# Patient Record
Sex: FEMALE | Race: WHITE | NOT HISPANIC OR LATINO | URBAN - METROPOLITAN AREA
[De-identification: names, ages, dates, MRNs, and addresses within clinical notes are randomized per-mention and may not be internally consistent; named-entity substitution may affect disease eponyms.]

---

## 2022-02-28 ENCOUNTER — WEB ENCOUNTER (OUTPATIENT)
Dept: URBAN - METROPOLITAN AREA CLINIC 113 | Facility: CLINIC | Age: 22
End: 2022-02-28

## 2022-02-28 ENCOUNTER — OFFICE VISIT (OUTPATIENT)
Dept: URBAN - METROPOLITAN AREA CLINIC 113 | Facility: CLINIC | Age: 22
End: 2022-02-28
Payer: COMMERCIAL

## 2022-02-28 VITALS
TEMPERATURE: 98.2 F | RESPIRATION RATE: 18 BRPM | HEART RATE: 57 BPM | BODY MASS INDEX: 24.35 KG/M2 | HEIGHT: 60 IN | DIASTOLIC BLOOD PRESSURE: 78 MMHG | SYSTOLIC BLOOD PRESSURE: 114 MMHG | WEIGHT: 124 LBS

## 2022-02-28 DIAGNOSIS — R10.10 UPPER ABDOMINAL PAIN: ICD-10-CM

## 2022-02-28 DIAGNOSIS — R14.0 ABDOMINAL BLOATING: ICD-10-CM

## 2022-02-28 DIAGNOSIS — K62.5 BRIGHT RED BLOOD PER RECTUM: ICD-10-CM

## 2022-02-28 PROCEDURE — 99204 OFFICE O/P NEW MOD 45 MIN: CPT | Performed by: NURSE PRACTITIONER

## 2022-02-28 RX ORDER — HYDROCORTISONE ACETATE 25 MG/1
1 SUPPOSITORY SUPPOSITORY RECTAL
Qty: 7 SUPPOSITORIES | Refills: 1 | OUTPATIENT

## 2022-02-28 NOTE — HPI-TODAY'S VISIT:
This is a 21-year-old female with a history of migraines, constipation predominant irritable bowel syndrome, and an umbilical hernia referred from Dr. Mooney for evaluation of rectal bleeding. She reports onset of intermittent rectal bleeding early in 2021.  Frequency is increasing.  Currently, she has bleeding at least once a week reporting bright red blood on the tissue or in the toilet water.  Last week, she noticed an increase in volume.  She denies proctalgia or perianal swelling.  She has a history of constipation for which she took Linzess and Trulance in the past.  On daily therapy, her stools became too frequent.  Now, she is having a bowel movement every day without medications.  She reports her stools are of a normal consistency.  Occasionally, she has hard stools.  She reports frequent bloating.  She also has infrequent cramping across her upper abdomen that may occur before or after a meal.  This is unassociated with bowel movements.  She does not feel as though it is significant enough to warrant medication.  She denies any other abdominal symptoms.  She takes Advil or ibuprofen or Tylenol for headaches.  She reports a stable weight.  She had a colonoscopy at [facility] in October 2021.  She reports a normal exam.  The report is unavailable.  She provides lab results on her electronic chart accessed by her phone.   Labs  7/27/2021: CBC: WBC 9.4, hemoglobin 13.2, MCV 96, platelet 272.  TSH 1.50.  Free T4 8.5.   3/18/2021 CBC: WBC 9.4, hemoglobin 13.2, MCV 96, platelet 272.  Normal BMP and LFTs.

## 2022-02-28 NOTE — PHYSICAL EXAM RECTAL:
normal tone , no external hemorrhoids , no masses palpable , no melena , no red blood , No tenderness on NADIA

## 2022-03-18 ENCOUNTER — TELEPHONE ENCOUNTER (OUTPATIENT)
Dept: URBAN - METROPOLITAN AREA CLINIC 113 | Facility: CLINIC | Age: 22
End: 2022-03-18

## 2022-03-29 ENCOUNTER — OFFICE VISIT (OUTPATIENT)
Dept: URBAN - METROPOLITAN AREA CLINIC 107 | Facility: CLINIC | Age: 22
End: 2022-03-29
Payer: COMMERCIAL

## 2022-03-29 VITALS
RESPIRATION RATE: 18 BRPM | HEIGHT: 60 IN | HEART RATE: 60 BPM | DIASTOLIC BLOOD PRESSURE: 84 MMHG | SYSTOLIC BLOOD PRESSURE: 120 MMHG | BODY MASS INDEX: 23.75 KG/M2 | WEIGHT: 121 LBS | TEMPERATURE: 98.2 F

## 2022-03-29 DIAGNOSIS — R12 HEARTBURN: ICD-10-CM

## 2022-03-29 DIAGNOSIS — K92.1 MELENA: ICD-10-CM

## 2022-03-29 DIAGNOSIS — R10.13 EPIGASTRIC PAIN: ICD-10-CM

## 2022-03-29 DIAGNOSIS — K62.5 BRIGHT RED BLOOD PER RECTUM: ICD-10-CM

## 2022-03-29 PROCEDURE — 99214 OFFICE O/P EST MOD 30 MIN: CPT

## 2022-03-29 RX ORDER — HYDROCORTISONE ACETATE 25 MG/1
1 SUPPOSITORY SUPPOSITORY RECTAL
Qty: 7 SUPPOSITORIES | Refills: 1 | Status: ON HOLD | COMMUNITY

## 2022-03-29 RX ORDER — PANTOPRAZOLE SODIUM 40 MG/1
1 TABLET TABLET, DELAYED RELEASE ORAL ONCE A DAY
Qty: 90 TABLET | Refills: 0 | OUTPATIENT
Start: 2022-03-29

## 2022-03-29 NOTE — HPI-OTHER HISTORIES
She had a colonoscopy at [facility] in October 2021.  She reports a normal exam.  The report is unavailable.  She provides lab results on her electronic chart accessed by her phone.   Labs  7/27/2021: CBC: WBC 9.4, hemoglobin 13.2, MCV 96, platelet 272.  TSH 1.50.  Free T4 8.5.   3/18/2021 CBC: WBC 9.4, hemoglobin 13.2, MCV 96, platelet 272.  Normal BMP and LFTs.

## 2022-03-29 NOTE — HPI-TODAY'S VISIT:
21-year-old female with history of migraines, constipation predominant IBS, and umbilical hernia presents for evaluation of black stools, abdominal cramping and burning.  She was last seen on 2/28/2022 as a referral for rectal bleeding.  Rectal bleeding was intermittent and NADIA was negative for external hemorrhoids or fissures.  Internal hemorrhoids were suspected she had a normal colonoscopy findings prior.  She was recommended to try daily fiber and was prescribed Anusol suppositories for topical therapy.  We will attempt to obtain her colonoscopy records in the interim.  Her abdominal bloating and upper abdominal cramping is likely functional associated with a history of IBS.  She was also recommended a trial of low FODMAP diet.  She was also recommended a trial of IBgard twice daily.  Patient contacted the office on 3/18 reporting black tarry stools after use of Anusol suppository.  She was advised to stop NSAIDs and suppositories and monitor for persisting symptoms.  On 3/23/2022 she reported resolution of black tarry stools however she complained of stomach cramping, headache and a burning sensation in her stomach in the "bad hungry pain in her stomach."   Patient denies any further episodes of melena. She does however continue to experience epigastric burning and pain described as "hunger pain." She has taken NSAIDs on occasion over the last few years for headaches and migraines, but nothing excessive. She also admits to recent heartburn and nausea after consuming three alcoholic beverages at a wedding. Bowel movements are regular and daily. No BRBPR. She has not used any additional Anusol suppositories.

## 2022-05-03 ENCOUNTER — OFFICE VISIT (OUTPATIENT)
Dept: URBAN - METROPOLITAN AREA SURGERY CENTER 25 | Facility: SURGERY CENTER | Age: 22
End: 2022-05-03
Payer: COMMERCIAL

## 2022-05-03 ENCOUNTER — OFFICE VISIT (OUTPATIENT)
Dept: URBAN - METROPOLITAN AREA CLINIC 107 | Facility: CLINIC | Age: 22
End: 2022-05-03

## 2022-05-03 DIAGNOSIS — K31.89 REACTIVE GASTROPATHY: ICD-10-CM

## 2022-05-03 DIAGNOSIS — K92.1 MELENA: ICD-10-CM

## 2022-05-03 PROCEDURE — 43239 EGD BIOPSY SINGLE/MULTIPLE: CPT | Performed by: INTERNAL MEDICINE

## 2022-05-03 PROCEDURE — G8907 PT DOC NO EVENTS ON DISCHARG: HCPCS | Performed by: INTERNAL MEDICINE

## 2022-05-03 RX ORDER — PANTOPRAZOLE SODIUM 40 MG/1
1 TABLET TABLET, DELAYED RELEASE ORAL ONCE A DAY
Qty: 90 TABLET | Refills: 0 | Status: ACTIVE | COMMUNITY
Start: 2022-03-29

## 2022-05-03 RX ORDER — HYDROCORTISONE ACETATE 25 MG/1
1 SUPPOSITORY SUPPOSITORY RECTAL
Qty: 7 SUPPOSITORIES | Refills: 1 | Status: ON HOLD | COMMUNITY

## 2022-05-31 ENCOUNTER — DASHBOARD ENCOUNTERS (OUTPATIENT)
Age: 22
End: 2022-05-31

## 2022-05-31 ENCOUNTER — OFFICE VISIT (OUTPATIENT)
Dept: URBAN - METROPOLITAN AREA CLINIC 107 | Facility: CLINIC | Age: 22
End: 2022-05-31
Payer: COMMERCIAL

## 2022-05-31 VITALS
BODY MASS INDEX: 24.15 KG/M2 | WEIGHT: 123 LBS | RESPIRATION RATE: 18 BRPM | HEIGHT: 60 IN | HEART RATE: 64 BPM | SYSTOLIC BLOOD PRESSURE: 106 MMHG | DIASTOLIC BLOOD PRESSURE: 71 MMHG | TEMPERATURE: 97.8 F

## 2022-05-31 DIAGNOSIS — K62.5 BRIGHT RED BLOOD PER RECTUM: ICD-10-CM

## 2022-05-31 DIAGNOSIS — R14.0 ABDOMINAL BLOATING: ICD-10-CM

## 2022-05-31 DIAGNOSIS — R10.10 UPPER ABDOMINAL PAIN: ICD-10-CM

## 2022-05-31 DIAGNOSIS — R10.13 EPIGASTRIC PAIN: ICD-10-CM

## 2022-05-31 DIAGNOSIS — R12 HEARTBURN: ICD-10-CM

## 2022-05-31 DIAGNOSIS — K92.1 MELENA: ICD-10-CM

## 2022-05-31 PROBLEM — 74474003: Status: ACTIVE | Noted: 2022-02-28

## 2022-05-31 PROBLEM — 83132003: Status: ACTIVE | Noted: 2022-02-28

## 2022-05-31 PROBLEM — 116289008: Status: ACTIVE | Noted: 2022-02-28

## 2022-05-31 PROCEDURE — 99214 OFFICE O/P EST MOD 30 MIN: CPT | Performed by: INTERNAL MEDICINE

## 2022-05-31 RX ORDER — HYDROCORTISONE ACETATE 25 MG/1
1 SUPPOSITORY SUPPOSITORY RECTAL
Qty: 7 SUPPOSITORIES | Refills: 1 | Status: ON HOLD | COMMUNITY

## 2022-05-31 RX ORDER — PANTOPRAZOLE SODIUM 40 MG/1
1 TABLET TABLET, DELAYED RELEASE ORAL ONCE A DAY
Qty: 90 TABLET | Refills: 0 | Status: ACTIVE | COMMUNITY
Start: 2022-03-29

## 2022-05-31 NOTE — HPI-TODAY'S VISIT:
22-year-old female presenting for follow-up.  She was last seen in the clinic in March 2022. She did have an upper endoscopy performed on May 3, 2022.  She is found have a normal esophagus.  She did have diffuse gastritis throughout the antrum and body with aphthous ulcerations. Biopsies were significant for chemical reactive gastritis.  Her biopsies were negative for H. pylori or intestinal metaplasia.  She has been taking pantoprazole once daily.  She does also avoid NSAIDs.  She feels like her symptoms have improved some.  3/29/22 21-year-old female with history of migraines, constipation predominant IBS, and umbilical hernia presents for evaluation of black stools, abdominal cramping and burning.  She was last seen on 2/28/2022 as a referral for rectal bleeding.  Rectal bleeding was intermittent and NADIA was negative for external hemorrhoids or fissures.  Internal hemorrhoids were suspected she had a normal colonoscopy findings prior.  She was recommended to try daily fiber and was prescribed Anusol suppositories for topical therapy.  We will attempt to obtain her colonoscopy records in the interim.  Her abdominal bloating and upper abdominal cramping is likely functional associated with a history of IBS.  She was also recommended a trial of low FODMAP diet.  She was also recommended a trial of IBgard twice daily.  Patient contacted the office on 3/18 reporting black tarry stools after use of Anusol suppository.  She was advised to stop NSAIDs and suppositories and monitor for persisting symptoms.  On 3/23/2022 she reported resolution of black tarry stools however she complained of stomach cramping, headache and a burning sensation in her stomach in the "bad hungry pain in her stomach."   Patient denies any further episodes of melena. She does however continue to experience epigastric burning and pain described as "hunger pain." She has taken NSAIDs on occasion over the last few years for headaches and migraines, but nothing excessive. She also admits to recent heartburn and nausea after consuming three alcoholic beverages at a wedding. Bowel movements are regular and daily. No BRBPR. She has not used any additional Anusol suppositories. This is a 21-year-old female with a history of migraines, constipation predominant irritable bowel syndrome, and an umbilical hernia referred from Dr. Mooney for evaluation of rectal bleeding. She reports onset of intermittent rectal bleeding early in 2021.  Frequency is increasing.  Currently, she has bleeding at least once a week reporting bright red blood on the tissue or in the toilet water.  Last week, she noticed an increase in volume.  She denies proctalgia or perianal swelling.  She has a history of constipation for which she took Linzess and Trulance in the past.  On daily therapy, her stools became too frequent.  Now, she is having a bowel movement every day without medications.  She reports her stools are of a normal consistency.  Occasionally, she has hard stools.  She reports frequent bloating.  She also has infrequent cramping across her upper abdomen that may occur before or after a meal.  This is unassociated with bowel movements.  She does not feel as though it is significant enough to warrant medication.  She denies any other abdominal symptoms.  She takes Advil or ibuprofen or Tylenol for headaches.  She reports a stable weight.  She had a colonoscopy at [facility] in October 2021.  She reports a normal exam.  The report is unavailable.  She provides lab results on her electronic chart accessed by her phone.   Labs  7/27/2021: CBC: WBC 9.4, hemoglobin 13.2, MCV 96, platelet 272.  TSH 1.50.  Free T4 8.5.   3/18/2021 CBC: WBC 9.4, hemoglobin 13.2, MCV 96, platelet 272.  Normal BMP and LFTs.

## 2022-05-31 NOTE — HPI-OTHER HISTORIES
She had a colonoscopy at [facility] in October 2021.  She reports a normal exam.  The report is unavailable.  She provides lab results on her electronic chart accessed by her phone.   Labs  7/27/2021: CBC: WBC 9.4, hemoglobin 13.2, MCV 96, platelet 272.  TSH 1.50.  Free T4 8.5.   3/18/2021 CBC: WBC 9.4, hemoglobin 13.2, MCV 96, platelet 272.  Normal BMP and LFTs.